# Patient Record
Sex: FEMALE | NOT HISPANIC OR LATINO | URBAN - METROPOLITAN AREA
[De-identification: names, ages, dates, MRNs, and addresses within clinical notes are randomized per-mention and may not be internally consistent; named-entity substitution may affect disease eponyms.]

---

## 2025-01-28 ENCOUNTER — APPOINTMENT (OUTPATIENT)
Dept: OBSTETRICS AND GYNECOLOGY | Facility: CLINIC | Age: 50
End: 2025-01-28
Payer: COMMERCIAL

## 2025-01-28 VITALS
BODY MASS INDEX: 31.72 KG/M2 | HEIGHT: 61 IN | SYSTOLIC BLOOD PRESSURE: 140 MMHG | WEIGHT: 168 LBS | DIASTOLIC BLOOD PRESSURE: 98 MMHG

## 2025-01-28 DIAGNOSIS — Z12.11 SCREENING FOR COLON CANCER: Primary | ICD-10-CM

## 2025-01-28 DIAGNOSIS — L70.9 ACNE, UNSPECIFIED ACNE TYPE: ICD-10-CM

## 2025-01-28 DIAGNOSIS — I10 HYPERTENSION, UNSPECIFIED TYPE: ICD-10-CM

## 2025-01-28 DIAGNOSIS — N93.9 ABNORMAL UTERINE BLEEDING (AUB): ICD-10-CM

## 2025-01-28 PROCEDURE — 3008F BODY MASS INDEX DOCD: CPT | Performed by: OBSTETRICS & GYNECOLOGY

## 2025-01-28 PROCEDURE — 99203 OFFICE O/P NEW LOW 30 MIN: CPT | Performed by: OBSTETRICS & GYNECOLOGY

## 2025-01-28 PROCEDURE — 1036F TOBACCO NON-USER: CPT | Performed by: OBSTETRICS & GYNECOLOGY

## 2025-01-28 PROCEDURE — 3080F DIAST BP >= 90 MM HG: CPT | Performed by: OBSTETRICS & GYNECOLOGY

## 2025-01-28 PROCEDURE — 3077F SYST BP >= 140 MM HG: CPT | Performed by: OBSTETRICS & GYNECOLOGY

## 2025-01-28 RX ORDER — ETONOGESTREL AND ETHINYL ESTRADIOL .12; .015 MG/D; MG/D
RING VAGINAL
COMMUNITY

## 2025-01-28 ASSESSMENT — ENCOUNTER SYMPTOMS
CONSTITUTIONAL NEGATIVE: 0
RESPIRATORY NEGATIVE: 0
CARDIOVASCULAR NEGATIVE: 0
ENDOCRINE NEGATIVE: 0
GASTROINTESTINAL NEGATIVE: 0
ALLERGIC/IMMUNOLOGIC NEGATIVE: 0
HEMATOLOGIC/LYMPHATIC NEGATIVE: 0
PSYCHIATRIC NEGATIVE: 0
MUSCULOSKELETAL NEGATIVE: 0
EYES NEGATIVE: 0
NEUROLOGICAL NEGATIVE: 0

## 2025-01-28 ASSESSMENT — PAIN SCALES - GENERAL: PAINLEVEL_OUTOF10: 0-NO PAIN

## 2025-01-28 NOTE — PROGRESS NOTES
Subjective   Patient ID: Mima Shen is a 49 y.o. female who presents for Annual Exam.    49 year old female presenting for annual exam, and to establish care. Has been on the NuvaRing since 2023, now experiencing some breakthrough bleeding every 6-7 weeks with moderate flow for 1-2 days. Notes hot flashes, night sweats but they have improved.     Last pap 2022-neg/neg  2023- hysteroscopy for AUB, myomectomy   Last mammogram 2024- [facility], benign, getting yearly screening thru job (9/17/2024)- will bring in records   Colonoscopy- needs      Review of Systems   All other systems reviewed and are negative.      Objective   Physical Exam  Vitals and nursing note reviewed.   Constitutional:       Appearance: Normal appearance.   HENT:      Head: Normocephalic.      Nose: Nose normal.      Mouth/Throat:      Mouth: Mucous membranes are moist.      Pharynx: Oropharynx is clear.   Eyes:      Conjunctiva/sclera: Conjunctivae normal.      Pupils: Pupils are equal, round, and reactive to light.   Cardiovascular:      Rate and Rhythm: Normal rate and regular rhythm.      Pulses: Normal pulses.   Pulmonary:      Effort: Pulmonary effort is normal.      Breath sounds: Normal breath sounds.   Abdominal:      General: Bowel sounds are normal.      Palpations: Abdomen is soft.   Genitourinary:     General: Normal vulva.      Exam position: Lithotomy position.      Vagina: Normal.      Cervix: Normal.      Uterus: Normal.       Adnexa: Right adnexa normal and left adnexa normal.      Rectum: Normal.   Musculoskeletal:         General: Normal range of motion.      Cervical back: Normal range of motion and neck supple.   Skin:     General: Skin is warm and dry.      Capillary Refill: Capillary refill takes less than 2 seconds.   Neurological:      General: No focal deficit present.      Mental Status: She is alert and oriented to person, place, and time. Mental status is at baseline.   Psychiatric:         Mood and  Affect: Mood normal.         Assessment/Plan   Problem List Items Addressed This Visit    None  Visit Diagnoses         Codes    Screening for colon cancer    -  Primary Z12.11    Relevant Orders    Colonoscopy Screening; Average Risk Patient    Abnormal uterine bleeding (AUB)     N93.9    Relevant Orders    US PELVIS TRANSABDOMINAL WITH TRANSVAGINAL          Discussed stopping NuvaRing to monitor bleeding and due to hypertension in the office, will need to follow up the primary care. Will obtain TVUS.          ROMAN Nevarez-CNP 01/28/25 4:13 PM

## 2025-02-07 ENCOUNTER — OFFICE VISIT (OUTPATIENT)
Dept: DERMATOLOGY | Facility: CLINIC | Age: 50
End: 2025-02-07
Payer: COMMERCIAL

## 2025-02-07 DIAGNOSIS — R21 RASH AND OTHER NONSPECIFIC SKIN ERUPTION: Primary | ICD-10-CM

## 2025-02-07 DIAGNOSIS — L85.3 XEROSIS CUTIS: ICD-10-CM

## 2025-02-07 DIAGNOSIS — L29.9 PRURITUS: ICD-10-CM

## 2025-02-07 DIAGNOSIS — D48.5 NEOPLASM OF UNCERTAIN BEHAVIOR OF SKIN: ICD-10-CM

## 2025-02-07 PROCEDURE — 99204 OFFICE O/P NEW MOD 45 MIN: CPT | Performed by: DERMATOLOGY

## 2025-02-07 PROCEDURE — 11104 PUNCH BX SKIN SINGLE LESION: CPT | Performed by: DERMATOLOGY

## 2025-02-07 PROCEDURE — 11105 PUNCH BX SKIN EA SEP/ADDL: CPT | Performed by: DERMATOLOGY

## 2025-02-07 ASSESSMENT — ITCH NUMERIC RATING SCALE: HOW SEVERE IS YOUR ITCHING?: 3

## 2025-02-07 NOTE — PROGRESS NOTES
Subjective     Mima Shen is a 49 y.o. female who presents for the following: Rash.  She reports starting years ago, she began developing itchy bumps all over her body.  She thinks it has been getting worse recently with about 1-3 new lesions once a month.  She notes the lesions are itchy and sometimes tender, and no one else in her household has similar lesions.  She reports she had bed bugs many years ago in a different house, so she monitors closely for bed bugs and has not seen any and does not think these are bug bites.  She denies any blisters.        Review of Systems:  No other skin or systemic complaints other than what is documented elsewhere in the note.    The following portions of the chart were reviewed this encounter and updated as appropriate:       Skin Cancer History  No skin cancer on file.    Specialty Problems    None      Past Dermatologic / Past Relevant Medical History:    No history of eczema or psoriasis    Family History:    No family history of eczema or psoriasis    Social History:    The patient states she works in the [employer]    Allergies:  Sulfamethoxazole-trimethoprim    Current Medications / CAM's:    Current Outpatient Medications:     clindamycin (Cleocin T) 1 % lotion, Apply topically 2 times a day., Disp: 60 mL, Rfl: 11    EluRyng 0.12-0.015 mg/24 hr vaginal ring, INSERT 1 RING VAGINALLY AND LEAVE IN PLACE FOR 3 WEEKS THEN REMOVE AND REPLACE IMMEDIATELY TO AVOID MENSES, Disp: , Rfl:      Objective   Well appearing patient in no apparent distress; mood and affect are within normal limits.    A full examination was performed including scalp, face, eyes, ears, nose, lips, neck, chest, axillae, abdomen, back, bilateral upper extremities, and bilateral lower extremities. All findings within normal limits unless otherwise noted below.    Assessment/Plan   1. Rash and other nonspecific skin eruption  Scattered on her trunk and all 4  extremities, there are several pink to erythematous papules, most of which have a central ulceration and/or crust and possible excoriation, and numerous hyperpigmented macules and papules possibly consistent with postinflammatory hyperpigmentation.  There are no yesica pustules or vesicles or bullae on exam today.    Pink to erythematous papules and hyperpigmented macules and papules - trunk and extremities.  The clinical differential diagnosis for these lesions includes possibly prurigo nodularis versus folliculitis versus arthropod bites versus lymphomatoid papulosis.  The nature of each of these conditions and management options, including possible biopsy for further diagnostic evaluation, were discussed extensively with the patient today.  After discussing the risks and benefits of various management options, the patient expressed understanding and wishes to undergo biopsy today.  Thus, at this time, we recommend punch biopsy of 2 representative lesion on the patient's right upper back and right lateral proximal leg in the office today for further diagnostic evaluation.  In the meantime, while we await biopsy results, I recommend empiric topical antibiotic therapy with Clindamycin 1% lotion, which the patient was instructed to apply twice daily to the affected areas of her body for the next 2 weeks.  The risks, benefits, and side effects of this medication were discussed.  The patient expressed understanding, is in agreement with this plan, and wishes to proceed with the biopsy today.    Skin biopsy  Type of biopsy: punch    Informed consent: discussed and consent obtained    Timeout: patient name, date of birth, surgical site, and procedure verified    Procedure prep:  Patient was prepped and draped  Anesthesia: the lesion was anesthetized in a standard fashion    Anesthetic:  1% lidocaine w/ epinephrine 1-100,000 local infiltration  Punch size:  4 mm  Suture size:  4-0  Suture type: Prolene (polypropylene)     Suture removal (days):  14  Hemostasis achieved with: suture    Outcome: patient tolerated procedure well    Post-procedure details: sterile dressing applied and wound care instructions given    Dressing type: bandage and petrolatum      clindamycin (Cleocin T) 1 % lotion  Apply topically 2 times a day.    2. Neoplasm of uncertain behavior of skin (2)  Right Upper Back  Ulcerated hyperpigmented papule              Lesion biopsy  Type of biopsy: punch    Informed consent: discussed and consent obtained    Timeout: patient name, date of birth, surgical site, and procedure verified    Procedure prep:  Patient was prepped and draped  Anesthesia: the lesion was anesthetized in a standard fashion    Anesthetic:  1% lidocaine w/ epinephrine 1-100,000 local infiltration  Punch size:  4 mm  Suture size:  4-0  Suture type: Prolene (polypropylene)    Suture removal (days):  14  Hemostasis achieved with: suture    Outcome: patient tolerated procedure well    Post-procedure details: sterile dressing applied and wound care instructions given    Dressing type: bandage and petrolatum      Staff Communication: Dermatology Local Anesthesia: 1 % Lidocaine / Epinephrine - Amount:    Specimen 1 - Dermatopathology- DERM LAB  Differential Diagnosis: PN vs folliculitis vs LyP vs arthropod  Check Margins Yes/No?:    Comments:    Dermpath Lab: Routine Histopathology (formalin-fixed tissue)    Right Lateral Proximal Leg  Scaled hyperpigmented papule              Lesion biopsy  Type of biopsy: punch    Informed consent: discussed and consent obtained    Timeout: patient name, date of birth, surgical site, and procedure verified    Anesthesia: the lesion was anesthetized in a standard fashion    Anesthetic:  1% lidocaine w/ epinephrine 1-100,000 local infiltration  Punch size:  3 mm  Suture size:  4-0  Suture type: Prolene (polypropylene)    Suture removal (days):  14  Hemostasis achieved with: suture    Outcome: patient tolerated procedure  well    Post-procedure details: sterile dressing applied and wound care instructions given    Dressing type: bandage and petrolatum      Staff Communication: Dermatology Local Anesthesia: 1 % Lidocaine / Epinephrine - Amount:    Specimen 2 - Dermatopathology- DERM LAB  Differential Diagnosis: PN vs folliculitis vs LyP vs arthropod  Check Margins Yes/No?:    Comments:    Dermpath Lab: Routine Histopathology (formalin-fixed tissue)    3. Pruritus    4. Xerosis cutis  Diffuse generalized dry, scaly skin.    Xerosis.  We emphasized the importance of dry, sensitive skin care, including the use of a mild soap, such as Dove, and frequent and aggressive moisturization, at least twice daily and immediately following showers or baths, with recommended over-the-counter moisturizing creams, such as Eucerin, Cetaphil, Cerave, or Aveeno, or Vaseline or Aquaphor ointments.        Jareth Saul MD  PGY-2, Dermatology      I saw and evaluated the patient. I personally obtained the key and critical portions of the history and physical exam or was physically present for key and critical portions performed by the resident/fellow. I reviewed the resident/fellow's documentation and discussed the patient with the resident/fellow. I agree with the resident/fellow's medical decision making as documented in the note.    Nikos Shannon MD

## 2025-02-08 RX ORDER — CLINDAMYCIN PHOSPHATE 10 UG/ML
LOTION TOPICAL 2 TIMES DAILY
Qty: 60 ML | Refills: 11 | Status: SHIPPED | OUTPATIENT
Start: 2025-02-08 | End: 2026-02-08

## 2025-02-10 ENCOUNTER — APPOINTMENT (OUTPATIENT)
Dept: RADIOLOGY | Facility: HOSPITAL | Age: 50
End: 2025-02-10
Payer: COMMERCIAL

## 2025-02-10 ENCOUNTER — TELEPHONE (OUTPATIENT)
Dept: DERMATOLOGY | Facility: CLINIC | Age: 50
End: 2025-02-10
Payer: COMMERCIAL

## 2025-02-10 NOTE — TELEPHONE ENCOUNTER
Pt left VM that when she saw Dr Seals on Friday she was told that he was going to send over a ATB oint to her pharmacy ?and when she went to her pharmacy it was not there ...

## 2025-02-11 ENCOUNTER — HOSPITAL ENCOUNTER (OUTPATIENT)
Dept: RADIOLOGY | Facility: HOSPITAL | Age: 50
Discharge: HOME | End: 2025-02-11
Payer: COMMERCIAL

## 2025-02-11 DIAGNOSIS — N93.9 ABNORMAL UTERINE BLEEDING (AUB): ICD-10-CM

## 2025-02-11 LAB
LABORATORY COMMENT REPORT: NORMAL
PATH REPORT.FINAL DX SPEC: NORMAL
PATH REPORT.GROSS SPEC: NORMAL
PATH REPORT.MICROSCOPIC SPEC OTHER STN: NORMAL
PATH REPORT.RELEVANT HX SPEC: NORMAL
PATH REPORT.TOTAL CANCER: NORMAL

## 2025-02-11 PROCEDURE — 76856 US EXAM PELVIC COMPLETE: CPT | Performed by: STUDENT IN AN ORGANIZED HEALTH CARE EDUCATION/TRAINING PROGRAM

## 2025-02-11 PROCEDURE — 76830 TRANSVAGINAL US NON-OB: CPT | Performed by: STUDENT IN AN ORGANIZED HEALTH CARE EDUCATION/TRAINING PROGRAM

## 2025-02-11 PROCEDURE — 76830 TRANSVAGINAL US NON-OB: CPT

## 2025-02-14 ENCOUNTER — APPOINTMENT (OUTPATIENT)
Dept: GASTROENTEROLOGY | Facility: EXTERNAL LOCATION | Age: 50
End: 2025-02-14
Payer: COMMERCIAL

## 2025-02-20 ENCOUNTER — APPOINTMENT (OUTPATIENT)
Dept: DERMATOLOGY | Facility: CLINIC | Age: 50
End: 2025-02-20
Payer: COMMERCIAL

## 2025-02-20 DIAGNOSIS — L57.8 DIFFUSE PHOTODAMAGE OF SKIN: ICD-10-CM

## 2025-02-20 DIAGNOSIS — R21 RASH AND OTHER NONSPECIFIC SKIN ERUPTION: Primary | ICD-10-CM

## 2025-02-20 DIAGNOSIS — L85.3 XEROSIS CUTIS: ICD-10-CM

## 2025-02-20 DIAGNOSIS — L73.9 FOLLICULITIS: ICD-10-CM

## 2025-02-20 PROCEDURE — 99214 OFFICE O/P EST MOD 30 MIN: CPT | Performed by: DERMATOLOGY

## 2025-02-20 RX ORDER — MINOCYCLINE HYDROCHLORIDE 100 MG/1
100 CAPSULE ORAL 2 TIMES DAILY
Qty: 28 CAPSULE | Refills: 0 | Status: SHIPPED | OUTPATIENT
Start: 2025-02-20 | End: 2025-03-06

## 2025-02-20 RX ORDER — TRIAMCINOLONE ACETONIDE 0.25 MG/G
CREAM TOPICAL
Qty: 80 G | Refills: 1 | Status: SHIPPED | OUTPATIENT
Start: 2025-02-20

## 2025-02-21 NOTE — PROGRESS NOTES
"Rekha Shen is a 49 y.o. female who presents for the following: Follow-up.  She reports beginning over 2 years ago she has been developing itchy bumps all over her body.  She thinks it has been getting worse recently with about 1-3 new lesions once a month.  She notes the lesions are itchy and sometimes tender, and no one else in her household has similar lesions.  She reports she had bed bugs many years ago in a different house, so she monitors closely for bed bugs and has not seen any and does not think these are bug bites.  She also states she has been treated with oral antibiotics for similar lesions in the past, which has helped.    Punch biopsy of 2 representative lesions performed at her last visit in our office on 2/7/25 revealed \"cutaneous ulceration\" on her right upper back, in which the features suggest outside trauma such as excoriation, and given the roughly folliculocentric nature of the ulceration, an excoriated folliculitis could not be excluded, and an excoriated arthropod assault could not be excluded either, and \"epidermal erosion\" on her right lateral proximal leg, the findings of which may be seen in an area of outside trauma such as excoriation.    Today, the patient states the biopsy sites healed well.  Since her last visit, she reports she has been using Clindamycin 1% lotion, which has not seemed to help.  She notes some of the bumps are still very itchy, and she again states she has been treated with oral antibiotics in the past, which have helped.  She denies any blisters.      Review of Systems:  No other skin or systemic complaints other than what is documented elsewhere in the note.    The following portions of the chart were reviewed this encounter and updated as appropriate:       Skin Cancer History  No skin cancer on file.    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 [employer]    Allergies:  Sulfamethoxazole-trimethoprim    Current Medications / CAM's:    Current Outpatient Medications:     clindamycin (Cleocin T) 1 % lotion, Apply topically 2 times a day., Disp: 60 mL, Rfl: 11    EluRyng 0.12-0.015 mg/24 hr vaginal ring, INSERT 1 RING VAGINALLY AND LEAVE IN PLACE FOR 3 WEEKS THEN REMOVE AND REPLACE IMMEDIATELY TO AVOID MENSES, Disp: , Rfl:     minocycline 100 mg capsule, Take 1 capsule (100 mg) by mouth 2 times a day for 14 days., Disp: 28 capsule, Rfl: 0    triamcinolone (Kenalog) 0.025 % cream, Apply twice daily to affected areas of body (avoid face, groin, body folds) for 2 weeks, then taper to twice daily on weekends only; repeat every 6-8 weeks as needed for flares, Disp: 80 g, Rfl: 1     Objective   Well appearing patient in no apparent distress; mood and affect are within normal limits.    A full examination was performed including scalp, face, eyes, ears, nose, lips, neck, chest, axillae, abdomen, back, bilateral upper extremities, and bilateral lower extremities. All findings within normal limits unless otherwise noted below.    Assessment/Plan   1. Rash and other nonspecific skin eruption  Right Breast  Scattered on her trunk and all 4 extremities, there are several pink to erythematous papules, most of which have a central ulceration and/or crust and possible excoriation, and numerous hyperpigmented macules and papules possibly consistent with postinflammatory hyperpigmentation.  There are no yesica pustules or vesicles or bullae on exam today.    Pink to erythematous papules and hyperpigmented macules and papules - trunk and extremities.  Based on the patient's history, recent exam and repeat exam today, and the histologic findings in her recent biopsies, the favored clinico-pathologic differential diagnosis for these lesions  includes possibly excoriations, such as neurotic excoriations, versus excoriated folliculitis versus excoriated arthropod assault.  The nature of each of these conditions and management options were discussed extensively with the patient in the office today.  After discussing the risks and benefits of various management options, the patient expressed understanding and wishes to begin a course of oral antibiotic therapy for folliculitis as detailed below.    In addition, given the possibility of excoriated arthropod bites, I also recommend topical steroid therapy with Triamcinolone 0.025% cream, which she was instructed to apply twice daily to the affected lesions on her body (avoid face, groin, body folds) for the next 1 to 2 weeks.  The risks, benefits, and side effects of this medication, including possible skin atrophy with its overuse, were discussed.  Lastly, given the inability to exclude an arthropod assault, I recommended she hire an  to come out to her home to investigate for possible arthropod infestations, such as bedbugs, spiders, ants, or other arthropods.  The patient expressed understanding and is in agreement with this plan.  Of note, her sutures were removed in the office today as well.    triamcinolone (Kenalog) 0.025 % cream - Right Breast  Apply twice daily to affected areas of body (avoid face, groin, body folds) for 2 weeks, then taper to twice daily on weekends only; repeat every 6-8 weeks as needed for flares    Related Medications  clindamycin (Cleocin T) 1 % lotion  Apply topically 2 times a day.    2. Folliculitis  Left Breast  Scattered on her trunk and all 4 extremities, there are several pink to erythematous papules, most of which have a central ulceration and/or crust and possible excoriation, and numerous hyperpigmented macules and papules possibly consistent with postinflammatory hyperpigmentation.  There are no yesica pustules or vesicles or bullae on exam  today.    Folliculitis - trunk and extremities.  The bacterial nature of this condition and treatment options, including topical therapy and oral antibiotics, were discussed extensively with the patient today.  After discussing the risks and benefits of various treatment options, the patient expressed understanding and wishes to begin with combination oral antibiotic and topical therapy.  Thus, I prescribed a 2-week course of Minocycline 100 mg PO BID as well as topical antibiotic therapy with Clindamycin 1% lotion, which she was instructed to apply twice daily to the affected areas.  The risks, benefits, and side effects of these medications, including possible dizziness or headaches with Minocycline, were discussed; the patient was instructed to take the oral antibiotic with food and a glass of water.  Lastly, I emphasized the importance of avoiding any further picking, scratching, excoriation, or other manipulation of the lesions, as these behaviors can lead to permanent scarring and damage to her skin.  She expressed understanding and is in agreement with this plan.    minocycline 100 mg capsule - Left Breast  Take 1 capsule (100 mg) by mouth 2 times a day for 14 days.    3. Xerosis cutis  Diffuse generalized dry, scaly skin.    Xerosis.  I emphasized the importance of dry, sensitive skin care, including the use of a mild soap, such as Dove, and frequent and aggressive moisturization, at least twice daily and immediately following showers or baths, with recommended over-the-counter moisturizing creams, such as Eucerin, Cetaphil, Cerave, or Aveeno, or Vaseline or Aquaphor ointments.    4. Diffuse photodamage of skin  Photodistributed  Diffuse photodamage with actinic changes with telangiectasia and mottled pigmentation in sun-exposed areas.    Photodamage.  The signs and symptoms of skin cancer were reviewed and the patient was advised to practice sun protection and sun avoidance, use daily sunscreen, and perform  regular self skin exams.  Sun protection was discussed, including avoiding the mid-day sun, wearing a sunscreen with SPF at least 50, and stressing the need for reapplication of sunscreen and applying more than they think they need.

## 2025-03-19 ENCOUNTER — APPOINTMENT (OUTPATIENT)
Dept: PRIMARY CARE | Facility: CLINIC | Age: 50
End: 2025-03-19
Payer: COMMERCIAL

## 2025-04-04 ENCOUNTER — APPOINTMENT (OUTPATIENT)
Dept: GASTROENTEROLOGY | Facility: EXTERNAL LOCATION | Age: 50
End: 2025-04-04
Payer: COMMERCIAL

## 2025-04-04 DIAGNOSIS — Z12.11 SCREENING FOR COLON CANCER: ICD-10-CM

## 2025-04-04 DIAGNOSIS — D12.8 BENIGN NEOPLASM OF RECTUM AND ANAL CANAL: ICD-10-CM

## 2025-04-04 DIAGNOSIS — D12.9 BENIGN NEOPLASM OF RECTUM AND ANAL CANAL: ICD-10-CM

## 2025-04-04 DIAGNOSIS — Z12.11 SCREENING FOR COLON CANCER: Primary | ICD-10-CM

## 2025-04-04 PROCEDURE — 88305 TISSUE EXAM BY PATHOLOGIST: CPT | Performed by: PATHOLOGY

## 2025-04-04 PROCEDURE — 45380 COLONOSCOPY AND BIOPSY: CPT | Performed by: INTERNAL MEDICINE

## 2025-04-04 PROCEDURE — 0753T DGTZ GLS MCRSCP SLD LEVEL IV: CPT

## 2025-04-04 PROCEDURE — 88305 TISSUE EXAM BY PATHOLOGIST: CPT

## 2025-04-07 ENCOUNTER — LAB REQUISITION (OUTPATIENT)
Dept: LAB | Facility: HOSPITAL | Age: 50
End: 2025-04-07
Payer: COMMERCIAL

## 2025-04-14 ENCOUNTER — APPOINTMENT (OUTPATIENT)
Dept: PRIMARY CARE | Facility: CLINIC | Age: 50
End: 2025-04-14
Payer: COMMERCIAL

## 2025-04-14 VITALS
SYSTOLIC BLOOD PRESSURE: 138 MMHG | OXYGEN SATURATION: 98 % | HEART RATE: 84 BPM | BODY MASS INDEX: 32.1 KG/M2 | WEIGHT: 170 LBS | DIASTOLIC BLOOD PRESSURE: 88 MMHG | HEIGHT: 61 IN

## 2025-04-14 DIAGNOSIS — R03.0 ELEVATED BLOOD PRESSURE READING: ICD-10-CM

## 2025-04-14 DIAGNOSIS — Z00.00 ROUTINE GENERAL MEDICAL EXAMINATION AT A HEALTH CARE FACILITY: Primary | ICD-10-CM

## 2025-04-14 DIAGNOSIS — R01.1 HEART MURMUR: ICD-10-CM

## 2025-04-14 DIAGNOSIS — Z76.89 ESTABLISHING CARE WITH NEW DOCTOR, ENCOUNTER FOR: ICD-10-CM

## 2025-04-14 PROCEDURE — 3008F BODY MASS INDEX DOCD: CPT | Performed by: PHYSICIAN ASSISTANT

## 2025-04-14 PROCEDURE — 1036F TOBACCO NON-USER: CPT | Performed by: PHYSICIAN ASSISTANT

## 2025-04-14 PROCEDURE — 90472 IMMUNIZATION ADMIN EACH ADD: CPT | Performed by: PHYSICIAN ASSISTANT

## 2025-04-14 PROCEDURE — 90715 TDAP VACCINE 7 YRS/> IM: CPT | Performed by: PHYSICIAN ASSISTANT

## 2025-04-14 PROCEDURE — 90471 IMMUNIZATION ADMIN: CPT | Performed by: PHYSICIAN ASSISTANT

## 2025-04-14 PROCEDURE — 90750 HZV VACC RECOMBINANT IM: CPT | Performed by: PHYSICIAN ASSISTANT

## 2025-04-14 PROCEDURE — 99396 PREV VISIT EST AGE 40-64: CPT | Performed by: PHYSICIAN ASSISTANT

## 2025-04-14 RX ORDER — FLUTICASONE PROPIONATE 50 MCG
1 SPRAY, SUSPENSION (ML) NASAL
COMMUNITY
Start: 2025-02-27

## 2025-04-14 ASSESSMENT — ENCOUNTER SYMPTOMS
PALPITATIONS: 0
HEMATOLOGIC/LYMPHATIC NEGATIVE: 1
LOSS OF SENSATION IN FEET: 0
CONSTITUTIONAL NEGATIVE: 1
VOMITING: 0
ENDOCRINE NEGATIVE: 1
PSYCHIATRIC NEGATIVE: 1
SHORTNESS OF BREATH: 0
DEPRESSION: 0
MUSCULOSKELETAL NEGATIVE: 1
ABDOMINAL PAIN: 0
OCCASIONAL FEELINGS OF UNSTEADINESS: 0
BACK PAIN: 0
EYES NEGATIVE: 1
WHEEZING: 0
RESPIRATORY NEGATIVE: 1
NEUROLOGICAL NEGATIVE: 1
ARTHRALGIAS: 0
NAUSEA: 0
ALLERGIC/IMMUNOLOGIC NEGATIVE: 1
NERVOUS/ANXIOUS: 0
COUGH: 0
DIZZINESS: 0
HEADACHES: 0

## 2025-04-14 ASSESSMENT — PATIENT HEALTH QUESTIONNAIRE - PHQ9
2. FEELING DOWN, DEPRESSED OR HOPELESS: NOT AT ALL
1. LITTLE INTEREST OR PLEASURE IN DOING THINGS: NOT AT ALL
SUM OF ALL RESPONSES TO PHQ9 QUESTIONS 1 AND 2: 0

## 2025-04-14 NOTE — PROGRESS NOTES
"Subjective   Patient ID: Mima Shen is a 49 y.o. female who presents for New Patient Visit and Annual Exam.    HPI   Patient Mima Shen 49 y.o. female is here today [facility]   previous PCP -none     single, works at [employer] -- lives with kids - 32 -33- 17 yo   specialists -- gyn   UTD dental and vision - UTD     PMhx significant medical hx none - elevated BP at times 140s/  PShx: fibroids   Social hx: etoh-social few per week  , no tobacco use, no illicit drug use   Gluten free diet and exercise- steps walks   immunizations shingrex and tdap due   FMhx: mother HTN , father HTN DM    Past medical, surgical, social, and family history all reviewed     Hx --heart murmur-and elevated blood pressures 140s over 90s--has never seen cardiology    No issues no chest pain no shortness of breath no no edema no headaches  patient states feeling well otherwise  denies fever, chills, N/V, headache, dizziness, CP, SOB, palpitations, edema, numbness, tingling, weakness  A chaperone was offered to the patient for the physical exam /  exam and was declined     Review of Systems   Constitutional: Negative.    HENT: Negative.     Eyes: Negative.    Respiratory: Negative.  Negative for cough, shortness of breath and wheezing.    Cardiovascular:  Negative for chest pain and palpitations.   Gastrointestinal:  Negative for abdominal pain, nausea and vomiting.   Endocrine: Negative.    Genitourinary: Negative.    Musculoskeletal: Negative.  Negative for arthralgias and back pain.   Skin: Negative.  Negative for rash.   Allergic/Immunologic: Negative.    Neurological: Negative.  Negative for dizziness and headaches.   Hematological: Negative.    Psychiatric/Behavioral: Negative.  The patient is not nervous/anxious.        Objective   /88 (BP Location: Right arm, Patient Position: Sitting)   Pulse 84   Ht 1.549 m (5' 1\")   Wt 77.1 kg (170 lb)   SpO2 98%   BMI 32.12 kg/m²     Physical " Exam  Vitals and nursing note reviewed.   Constitutional:       Appearance: Normal appearance. She is normal weight.   HENT:      Head: Normocephalic and atraumatic.      Right Ear: Tympanic membrane, ear canal and external ear normal.      Left Ear: Tympanic membrane, ear canal and external ear normal.      Nose: Nose normal.      Mouth/Throat:      Mouth: Mucous membranes are moist.      Pharynx: Oropharynx is clear.   Eyes:      Extraocular Movements: Extraocular movements intact.      Pupils: Pupils are equal, round, and reactive to light.   Cardiovascular:      Rate and Rhythm: Normal rate and regular rhythm.      Heart sounds: Normal heart sounds.   Pulmonary:      Effort: Pulmonary effort is normal.      Breath sounds: Normal breath sounds.   Abdominal:      General: Abdomen is flat. Bowel sounds are normal.      Palpations: Abdomen is soft.   Genitourinary:     Comments: Per GYN   Musculoskeletal:         General: Normal range of motion.      Cervical back: Normal range of motion and neck supple.   Skin:     General: Skin is warm and dry.   Neurological:      General: No focal deficit present.      Mental Status: She is alert and oriented to person, place, and time.   Psychiatric:         Mood and Affect: Mood normal.         Behavior: Behavior normal.         Thought Content: Thought content normal.         Judgment: Judgment normal.         Assessment/Plan   Problem List Items Addressed This Visit    None  Visit Diagnoses       Routine general medical examination at a health care facility    -  Primary    Establishing care with new doctor, encounter for        Elevated blood pressure reading        Relevant Orders    Referral to Cardiology    Heart murmur        Relevant Orders    Referral to Cardiology          Well exam /est    -  patient stable and healthy  -  discussed healthy diet and exercise plan   -  f/u with specialists as directed   -  UTD on dental and vision exams, f/u as directed   -  Labs  with MM -- employer   -Vaccinations recommended today: tdap and shingrex due   -Follow-up annual exam in one year  -Colon cancer screening UTD - 4/2025  -Follow-up in one week to discuss all results      Heart murmur and elevated Blood pressure -- refer to cardiology     Labs and mammogram UTD per pt --done with [facility] 2/2025       Results of my examination, clinical findings, impression and diagnoses discussed with the patient as well as results of the latest test/lab work. The patient was in agreement with the plan and verbalized a good understanding of instructions and plans for treatment. At the end of the visit today, the patient felt that all questions had been answered satisfactorily. The patient was pleased with the visit and very appreciative for the care rendered.

## 2025-04-15 LAB
LABORATORY COMMENT REPORT: NORMAL
PATH REPORT.FINAL DX SPEC: NORMAL
PATH REPORT.GROSS SPEC: NORMAL
PATH REPORT.RELEVANT HX SPEC: NORMAL
PATH REPORT.TOTAL CANCER: NORMAL

## 2025-05-14 ENCOUNTER — OFFICE VISIT (OUTPATIENT)
Dept: CARDIOLOGY | Facility: HOSPITAL | Age: 50
End: 2025-05-14
Payer: COMMERCIAL

## 2025-05-14 VITALS
BODY MASS INDEX: 32.1 KG/M2 | HEIGHT: 61 IN | SYSTOLIC BLOOD PRESSURE: 165 MMHG | DIASTOLIC BLOOD PRESSURE: 103 MMHG | WEIGHT: 170 LBS | OXYGEN SATURATION: 98 % | HEART RATE: 87 BPM

## 2025-05-14 DIAGNOSIS — R01.1 HEART MURMUR: ICD-10-CM

## 2025-05-14 DIAGNOSIS — I10 HYPERTENSION, UNSPECIFIED TYPE: Primary | ICD-10-CM

## 2025-05-14 DIAGNOSIS — E78.5 HYPERLIPIDEMIA, UNSPECIFIED HYPERLIPIDEMIA TYPE: ICD-10-CM

## 2025-05-14 PROCEDURE — 3080F DIAST BP >= 90 MM HG: CPT | Performed by: INTERNAL MEDICINE

## 2025-05-14 PROCEDURE — 99205 OFFICE O/P NEW HI 60 MIN: CPT | Performed by: INTERNAL MEDICINE

## 2025-05-14 PROCEDURE — 99202 OFFICE O/P NEW SF 15 MIN: CPT | Performed by: INTERNAL MEDICINE

## 2025-05-14 PROCEDURE — 93005 ELECTROCARDIOGRAM TRACING: CPT | Performed by: INTERNAL MEDICINE

## 2025-05-14 PROCEDURE — 3008F BODY MASS INDEX DOCD: CPT | Performed by: INTERNAL MEDICINE

## 2025-05-14 PROCEDURE — 3077F SYST BP >= 140 MM HG: CPT | Performed by: INTERNAL MEDICINE

## 2025-05-14 RX ORDER — AMLODIPINE BESYLATE 5 MG/1
5 TABLET ORAL DAILY
Qty: 90 TABLET | Refills: 1 | Status: SHIPPED | OUTPATIENT
Start: 2025-05-14

## 2025-05-14 NOTE — PROGRESS NOTES
"Chief Complaint:   No chief complaint on file.     History Of Present Illness:    Mima Shen is a 49 y.o. female here regarding a heart murmur and elevated BP's. She has a history of fibroids s/p hysteroscopic myomectomy, associated Fe deficient anemia, hyperlipidemia, and class I obesity.    She reports feeling well and denies cardiovascular symptoms.         Last Recorded Vitals:  Vitals:    05/14/25 1540   BP: (!) 165/103   BP Location: Left arm   Patient Position: Sitting   Pulse: 87   SpO2: 98%   Weight: 77.1 kg (170 lb)   Height: 1.549 m (5' 1\")              Allergies:  Sulfamethoxazole-trimethoprim    Outpatient Medications:  Current Outpatient Medications   Medication Instructions    clindamycin (Cleocin T) 1 % lotion Topical, 2 times daily    EluRyng 0.12-0.015 mg/24 hr vaginal ring INSERT 1 RING VAGINALLY AND LEAVE IN PLACE FOR 3 WEEKS THEN REMOVE AND REPLACE IMMEDIATELY TO AVOID MENSES    fluticasone (Flonase) 50 mcg/actuation nasal spray 1 spray, Daily RT    triamcinolone (Kenalog) 0.025 % cream Apply twice daily to affected areas of body (avoid face, groin, body folds) for 2 weeks, then taper to twice daily on weekends only; repeat every 6-8 weeks as needed for flares         Physical Exam:  Gen Well nourished middle aged female sitting up in NAD. Body mass index is 32.12 kg/m².  Eyes sclera nonicteric. Pupils normal size and symmetric.  Neck supple with no masses or thyromegaly.  CV rrr. 2/6 SM. No JVD or leg edema. No carotid bruits appreciated.  Pulm Lungs clear with normal respiratory effort.  Skin No bruises rashes or jaundice. No induration or nodules.  Psych Appropriate affect and mood. Normal judgement and insight.  Neuro Alert and conversant. Grossly nonfocal.       I reviewed the patient's ECG - NSR    I reviewed most recent imaging / labs / and office notes as well as details of any recent hospitalizations or ED visits.    Assessment/Plan   1.  Hypertension   BP uncontrolled. Starting " daily amlodipine. Continued exercise encouraged. Checking a renin / kenyon + PTH level.    2. Heart murmur  ? Flow murmur related to anemia. Checking a CBC and a TTE.    3. Hyperlipidemia   History of. Last LDL 2/2025 was fair. Will consider CAC testing next visit to help determine whether statin therapy will be beneficial to her long term.        Follow-up 2-3 months         Frank Roblero MD

## 2025-05-15 LAB
ALBUMIN SERPL-MCNC: 4.2 G/DL (ref 3.6–5.1)
ALDOST SERPL-MCNC: NORMAL NG/DL
ALDOST/RENIN PLAS-RTO: NORMAL {RATIO}
ALP SERPL-CCNC: 53 U/L (ref 37–153)
ALT SERPL-CCNC: 15 U/L (ref 6–29)
ANION GAP SERPL CALCULATED.4IONS-SCNC: 11 MMOL/L (CALC) (ref 7–17)
AST SERPL-CCNC: 16 U/L (ref 10–35)
ATRIAL RATE: 87 BPM
BILIRUB SERPL-MCNC: 0.3 MG/DL (ref 0.2–1.2)
BUN SERPL-MCNC: 10 MG/DL (ref 7–25)
CALCIUM SERPL-MCNC: 9.7 MG/DL (ref 8.6–10.4)
CHLORIDE SERPL-SCNC: 106 MMOL/L (ref 98–110)
CO2 SERPL-SCNC: 24 MMOL/L (ref 20–32)
CREAT SERPL-MCNC: 0.72 MG/DL (ref 0.5–1.03)
EGFRCR SERPLBLD CKD-EPI 2021: 101 ML/MIN/1.73M2
ERYTHROCYTE [DISTWIDTH] IN BLOOD BY AUTOMATED COUNT: 14.4 % (ref 11–15)
GLUCOSE SERPL-MCNC: 88 MG/DL (ref 65–99)
HCT VFR BLD AUTO: 44.2 % (ref 35–45)
HGB BLD-MCNC: 14.7 G/DL (ref 11.7–15.5)
MCH RBC QN AUTO: 29 PG (ref 27–33)
MCHC RBC AUTO-ENTMCNC: 33.3 G/DL (ref 32–36)
MCV RBC AUTO: 87.2 FL (ref 80–100)
P AXIS: 59 DEGREES
P OFFSET: 196 MS
P ONSET: 143 MS
PLATELET # BLD AUTO: 316 THOUSAND/UL (ref 140–400)
PMV BLD REES-ECKER: 9.6 FL (ref 7.5–12.5)
POTASSIUM SERPL-SCNC: 4.1 MMOL/L (ref 3.5–5.3)
PR INTERVAL: 152 MS
PROT SERPL-MCNC: 7 G/DL (ref 6.1–8.1)
PTH-INTACT SERPL-MCNC: 31 PG/ML (ref 16–77)
Q ONSET: 219 MS
QRS COUNT: 15 BEATS
QRS DURATION: 86 MS
QT INTERVAL: 376 MS
QTC CALCULATION(BAZETT): 452 MS
QTC FREDERICIA: 425 MS
R AXIS: 2 DEGREES
RBC # BLD AUTO: 5.07 MILLION/UL (ref 3.8–5.1)
RENIN PLAS-CCNC: NORMAL NG/ML/H
SODIUM SERPL-SCNC: 141 MMOL/L (ref 135–146)
T AXIS: 69 DEGREES
T OFFSET: 407 MS
TSH SERPL-ACNC: 0.78 MIU/L
VENTRICULAR RATE: 87 BPM
WBC # BLD AUTO: 7.3 THOUSAND/UL (ref 3.8–10.8)

## 2025-05-19 LAB
ALBUMIN SERPL-MCNC: 4.2 G/DL (ref 3.6–5.1)
ALDOST SERPL-MCNC: 20 NG/DL
ALDOST/RENIN PLAS-RTO: 17.1 RATIO (ref 0.9–28.9)
ALP SERPL-CCNC: 53 U/L (ref 37–153)
ALT SERPL-CCNC: 15 U/L (ref 6–29)
ANION GAP SERPL CALCULATED.4IONS-SCNC: 11 MMOL/L (CALC) (ref 7–17)
AST SERPL-CCNC: 16 U/L (ref 10–35)
BILIRUB SERPL-MCNC: 0.3 MG/DL (ref 0.2–1.2)
BUN SERPL-MCNC: 10 MG/DL (ref 7–25)
CALCIUM SERPL-MCNC: 9.7 MG/DL (ref 8.6–10.4)
CHLORIDE SERPL-SCNC: 106 MMOL/L (ref 98–110)
CO2 SERPL-SCNC: 24 MMOL/L (ref 20–32)
CREAT SERPL-MCNC: 0.72 MG/DL (ref 0.5–1.03)
EGFRCR SERPLBLD CKD-EPI 2021: 101 ML/MIN/1.73M2
ERYTHROCYTE [DISTWIDTH] IN BLOOD BY AUTOMATED COUNT: 14.4 % (ref 11–15)
GLUCOSE SERPL-MCNC: 88 MG/DL (ref 65–99)
HCT VFR BLD AUTO: 44.2 % (ref 35–45)
HGB BLD-MCNC: 14.7 G/DL (ref 11.7–15.5)
MCH RBC QN AUTO: 29 PG (ref 27–33)
MCHC RBC AUTO-ENTMCNC: 33.3 G/DL (ref 32–36)
MCV RBC AUTO: 87.2 FL (ref 80–100)
PLATELET # BLD AUTO: 316 THOUSAND/UL (ref 140–400)
PMV BLD REES-ECKER: 9.6 FL (ref 7.5–12.5)
POTASSIUM SERPL-SCNC: 4.1 MMOL/L (ref 3.5–5.3)
PROT SERPL-MCNC: 7 G/DL (ref 6.1–8.1)
PTH-INTACT SERPL-MCNC: 31 PG/ML (ref 16–77)
RBC # BLD AUTO: 5.07 MILLION/UL (ref 3.8–5.1)
RENIN PLAS-CCNC: 1.17 NG/ML/H (ref 0.25–5.82)
SODIUM SERPL-SCNC: 141 MMOL/L (ref 135–146)
TSH SERPL-ACNC: 0.78 MIU/L
WBC # BLD AUTO: 7.3 THOUSAND/UL (ref 3.8–10.8)

## 2025-06-02 ENCOUNTER — HOSPITAL ENCOUNTER (OUTPATIENT)
Dept: CARDIOLOGY | Facility: HOSPITAL | Age: 50
Discharge: HOME | End: 2025-06-02
Payer: COMMERCIAL

## 2025-06-02 DIAGNOSIS — R01.1 HEART MURMUR: ICD-10-CM

## 2025-06-02 DIAGNOSIS — I10 HYPERTENSION, UNSPECIFIED TYPE: ICD-10-CM

## 2025-06-02 PROCEDURE — 93306 TTE W/DOPPLER COMPLETE: CPT | Performed by: INTERNAL MEDICINE

## 2025-06-02 PROCEDURE — 93306 TTE W/DOPPLER COMPLETE: CPT

## 2025-06-03 LAB
AORTIC VALVE MEAN GRADIENT: 6 MMHG
AORTIC VALVE PEAK VELOCITY: 1.79 M/S
AV PEAK GRADIENT: 13 MMHG
AVA (PEAK VEL): 2.14 CM2
AVA (VTI): 2.23 CM2
EJECTION FRACTION APICAL 4 CHAMBER: 64
EJECTION FRACTION: 69 %
LEFT ATRIUM VOLUME AREA LENGTH INDEX BSA: 26.6 ML/M2
LEFT VENTRICLE INTERNAL DIMENSION DIASTOLE: 3.93 CM (ref 3.5–6)
LEFT VENTRICULAR OUTFLOW TRACT DIAMETER: 1.96 CM
MITRAL VALVE E/A RATIO: 0.86
RIGHT VENTRICLE PEAK SYSTOLIC PRESSURE: 27 MMHG
TRICUSPID ANNULAR PLANE SYSTOLIC EXCURSION: 2.1 CM

## 2025-06-04 LAB
ATRIAL RATE: 87 BPM
P AXIS: 59 DEGREES
P OFFSET: 196 MS
P ONSET: 143 MS
PR INTERVAL: 152 MS
Q ONSET: 219 MS
QRS COUNT: 15 BEATS
QRS DURATION: 86 MS
QT INTERVAL: 376 MS
QTC CALCULATION(BAZETT): 452 MS
QTC FREDERICIA: 425 MS
R AXIS: 2 DEGREES
T AXIS: 69 DEGREES
T OFFSET: 407 MS
VENTRICULAR RATE: 87 BPM

## 2025-06-12 ENCOUNTER — APPOINTMENT (OUTPATIENT)
Dept: OTOLARYNGOLOGY | Facility: CLINIC | Age: 50
End: 2025-06-12
Payer: COMMERCIAL

## 2025-06-12 VITALS — HEIGHT: 61 IN | WEIGHT: 168 LBS | BODY MASS INDEX: 31.72 KG/M2

## 2025-06-12 DIAGNOSIS — J30.89 SEASONAL ALLERGIC RHINITIS DUE TO OTHER ALLERGIC TRIGGER: ICD-10-CM

## 2025-06-12 DIAGNOSIS — R09.81 NASAL CONGESTION: ICD-10-CM

## 2025-06-12 DIAGNOSIS — J34.3 HYPERTROPHY OF NASAL TURBINATES: ICD-10-CM

## 2025-06-12 DIAGNOSIS — J31.0 CHRONIC RHINITIS: Primary | ICD-10-CM

## 2025-06-12 DIAGNOSIS — I10 HYPERTENSION, UNSPECIFIED TYPE: Primary | ICD-10-CM

## 2025-06-12 DIAGNOSIS — J34.2 NASAL SEPTAL DEVIATION: ICD-10-CM

## 2025-06-12 PROCEDURE — 3008F BODY MASS INDEX DOCD: CPT | Performed by: NURSE PRACTITIONER

## 2025-06-12 PROCEDURE — 31231 NASAL ENDOSCOPY DX: CPT | Performed by: NURSE PRACTITIONER

## 2025-06-12 PROCEDURE — 99204 OFFICE O/P NEW MOD 45 MIN: CPT | Performed by: NURSE PRACTITIONER

## 2025-06-12 PROCEDURE — 1036F TOBACCO NON-USER: CPT | Performed by: NURSE PRACTITIONER

## 2025-06-12 RX ORDER — LOSARTAN POTASSIUM AND HYDROCHLOROTHIAZIDE 12.5; 5 MG/1; MG/1
1 TABLET ORAL DAILY
Qty: 30 TABLET | Refills: 1 | Status: SHIPPED | OUTPATIENT
Start: 2025-06-12

## 2025-06-12 ASSESSMENT — PATIENT HEALTH QUESTIONNAIRE - PHQ9
SUM OF ALL RESPONSES TO PHQ9 QUESTIONS 1 AND 2: 0
1. LITTLE INTEREST OR PLEASURE IN DOING THINGS: NOT AT ALL
2. FEELING DOWN, DEPRESSED OR HOPELESS: NOT AT ALL

## 2025-06-12 ASSESSMENT — ENCOUNTER SYMPTOMS
DIARRHEA: 0
DIFFICULTY URINATING: 0
TREMORS: 0
SINUS PRESSURE: 1
CONFUSION: 0
VOMITING: 0
CONSTIPATION: 0
FACIAL SWELLING: 0
COLOR CHANGE: 0
HEADACHES: 0
WHEEZING: 0
NECK STIFFNESS: 0
FATIGUE: 0
SINUS PAIN: 0
WEAKNESS: 0
CHILLS: 0
SHORTNESS OF BREATH: 0
HALLUCINATIONS: 0
NECK PAIN: 0
TROUBLE SWALLOWING: 0
COUGH: 0
VOICE CHANGE: 0
FEVER: 0

## 2025-06-12 NOTE — PATIENT INSTRUCTIONS
No mass., lesion, polyps or purulence in the nasal passage.  Symptoms likely due to seasonal and environmental allergies.  Use saline  spray prior to using Flonase.  Take Claritin daily.  Mild bleeding noted  in the left inferior nasal turbinates. Likely from Flonase use.  Stop Flonase for 3 days and clean nose with saline.  Resume us of Flonase after 3 days.  Follow up as needed.

## 2025-06-12 NOTE — PROGRESS NOTES
Subjective   Patient ID: Mima Shen is a 49 y.o. female who presents for sinusitis    HPI  Patient with history of HTN, heart mumur and hyperlipidemia presents here today for sinusitis.    Onset: Since childhood but as an adult has had periodic sinus infection.   Duration: When she has a sinus infection it last about 7-10 days.   Character: nasal congestion; post nasal drainage and nasal obstruction. Using Aleve D  Allergy testing: Hs seasonal allergy but has not had allergy testing since 20 years ago. States allergic to dust, pollen and pet dander.  Meds: Currently using Flonase. She has been using it for less than a year.   Sinus surgery or trauma: None  Loss of sense of taste or smell: Only when she had COVID in 2021  Epiphora: no  Periorbital edema: under eye puffiness.     Review of Systems   Constitutional:  Negative for chills, fatigue and fever.   HENT:  Positive for sinus pressure. Negative for ear discharge, ear pain, facial swelling, hearing loss, nosebleeds, sinus pain, tinnitus, trouble swallowing and voice change.    Respiratory:  Negative for cough, shortness of breath and wheezing.    Gastrointestinal:  Negative for constipation, diarrhea and vomiting.   Genitourinary:  Negative for difficulty urinating.   Musculoskeletal:  Negative for neck pain and neck stiffness.   Skin:  Negative for color change.   Neurological:  Negative for tremors, weakness and headaches.   Psychiatric/Behavioral:  Negative for confusion and hallucinations.            Objective   Physical Exam    CONSTITUTIONAL: No acute distress, normal facial features; No fever; no chills  VOICE: No hoarseness or other audible abnormality  RESPIRATION: Breathing comfortably, no stridor; normal breathing effort  CV: No cyanosis visible on the face and neck area  EYES:Pupils equal and round ; no erythema; conjunctiva clear; sclera white  NEURO: Alert and oriented, able to raise eyebrows symmetrical bilateral, smile with no facial droop,  able to swallow  HEAD AND FACE: Symmetric facial features, no masses or lesions    Right ear examination: External ear normal.   Left ear examination: External ear normal.     NOSE: External nose midline  ORAL CAVITY: No lesions of external lips; uvula is midline; tongue with good mobility; no gross mass in oral cavity; mucosa appears pink   NECK/LYMPH: No obvious deformity or lesions; trachea is midline  PSYCH: Alert and oriented with appropriate mood and affect.    Patient ID: Mima Shen is a 49 y.o. female.    Procedures    PROCEDURE NOTE:   Procedure: Nasal endoscopy - diagnostic  Indication: concern for chronic sinusitis  Informed Consent obtained: risks, benefits, alternatives, and expectations discussed with pt and the pt wishes to proceed.     Findings: After topical decongestion with decongestant and anesthetic spray, nasal endoscopy was performed using an endoscope. The septum was right deviated. The inferior turbinates were hypertrophied.  The middle turbinates appeared healthy, the middle meatus is free of purulence, masses, lesions or polyps. The nasal passageway is patent. The nasopharynx was clear. There were no complications and the patient tolerated the procedure well.  ;      Assessment/Plan     1. Chronic rhinitis        2. Hypertrophy of nasal turbinates        3. Nasal septal deviation        4. Nasal congestion        5. Seasonal allergic rhinitis due to other allergic trigger             No mass., lesion, polyps or purulence in the nasal passage.  Symptoms likely due to seasonal and environmental allergies.  Use saline  spray prior to using Flonase.  Take Claritin daily.  Mild bleeding noted  in the left inferior nasal turbinates. Likely from Flonase use.  Stop Flonase for 3 days and clean nose with saline.  Resume us of Flonase after 3 days.  Follow up as needed.    CAMPOS Andrade 06/12/25 11:20 AM

## 2025-07-14 ENCOUNTER — OFFICE VISIT (OUTPATIENT)
Dept: CARDIOLOGY | Facility: HOSPITAL | Age: 50
End: 2025-07-14
Payer: COMMERCIAL

## 2025-07-14 VITALS
WEIGHT: 170 LBS | SYSTOLIC BLOOD PRESSURE: 171 MMHG | DIASTOLIC BLOOD PRESSURE: 99 MMHG | HEART RATE: 90 BPM | OXYGEN SATURATION: 99 % | BODY MASS INDEX: 32.1 KG/M2 | HEIGHT: 61 IN

## 2025-07-14 DIAGNOSIS — I10 HYPERTENSION, UNSPECIFIED TYPE: Primary | ICD-10-CM

## 2025-07-14 DIAGNOSIS — E78.5 HYPERLIPIDEMIA, UNSPECIFIED HYPERLIPIDEMIA TYPE: ICD-10-CM

## 2025-07-14 PROCEDURE — 99214 OFFICE O/P EST MOD 30 MIN: CPT | Performed by: INTERNAL MEDICINE

## 2025-07-14 PROCEDURE — 99212 OFFICE O/P EST SF 10 MIN: CPT

## 2025-07-14 PROCEDURE — 3080F DIAST BP >= 90 MM HG: CPT | Performed by: INTERNAL MEDICINE

## 2025-07-14 PROCEDURE — 3077F SYST BP >= 140 MM HG: CPT | Performed by: INTERNAL MEDICINE

## 2025-07-14 PROCEDURE — 3008F BODY MASS INDEX DOCD: CPT | Performed by: INTERNAL MEDICINE

## 2025-07-14 RX ORDER — LOSARTAN POTASSIUM AND HYDROCHLOROTHIAZIDE 25; 100 MG/1; MG/1
1 TABLET ORAL DAILY
Qty: 90 TABLET | Refills: 3 | Status: SHIPPED | OUTPATIENT
Start: 2025-07-14 | End: 2026-07-14

## 2025-07-14 NOTE — PROGRESS NOTES
"Chief Complaint:   Follow-up     History Of Present Illness:    Mima Shen is a 49 y.o. female here for follow-up. She has a history of fibroids s/p hysteroscopic myomectomy, associated Fe deficient anemia, hyperlipidemia, and hypertension.    She reports feeling well and denies cardiovascular symptoms. Last visit she had a TTE for a heart murmur that was unremarkable. She was started on amlodipine for hypertension but developed hives. She was switched to losartan / hydrochlorothiazide and has noted pressures in the 140's to 150's with home checks. She admits to increased stress today.        Last Recorded Vitals:  Vitals:    07/14/25 1529   BP: (!) 171/99   Pulse: 90   SpO2: 99%   Weight: 77.1 kg (170 lb)   Height: (!) 1.549 m (5' 1\")              Allergies:  Amlodipine and Sulfamethoxazole-trimethoprim    Outpatient Medications:  Current Outpatient Medications   Medication Instructions    clindamycin (Cleocin T) 1 % lotion Topical, 2 times daily    EluRyng 0.12-0.015 mg/24 hr vaginal ring INSERT 1 RING VAGINALLY AND LEAVE IN PLACE FOR 3 WEEKS THEN REMOVE AND REPLACE IMMEDIATELY TO AVOID MENSES    fluticasone (Flonase) 50 mcg/actuation nasal spray 1 spray, Daily RT    losartan-hydrochlorothiazide (Hyzaar) 100-25 mg tablet 1 tablet, oral, Daily    triamcinolone (Kenalog) 0.025 % cream Apply twice daily to affected areas of body (avoid face, groin, body folds) for 2 weeks, then taper to twice daily on weekends only; repeat every 6-8 weeks as needed for flares         Physical Exam:  Gen Well appearing middle aged female sitting up in NAD. Body mass index is 32.12 kg/m².   CV rrr. No m/r/g appreciated. No JVD or leg edema.   Pulm Lungs clear with normal respiratory effort.  Neuro Alert and conversant. Grossly nonfocal.       I reviewed most recent imaging / labs / and office notes.      Assessment/Plan   1.  Hypertension   BP remains uncontrolled though better with home checks. Increasing her Hyzaar. Low threshold " for added prazosin if her pressures remain elevated over the next week. Continued exercise and weight loss encouraged.        2. Hyperlipidemia   History of. Last LDL 2/2025 was fair. Will consider CAC testing next visit to help determine whether statin therapy will be beneficial to her long term.        Follow-up 3 months         Frank Roblero MD

## 2025-07-23 ENCOUNTER — APPOINTMENT (OUTPATIENT)
Dept: PRIMARY CARE | Facility: CLINIC | Age: 50
End: 2025-07-23
Payer: COMMERCIAL

## 2025-07-23 VITALS
HEIGHT: 61 IN | SYSTOLIC BLOOD PRESSURE: 138 MMHG | OXYGEN SATURATION: 97 % | DIASTOLIC BLOOD PRESSURE: 88 MMHG | WEIGHT: 166 LBS | BODY MASS INDEX: 31.34 KG/M2 | HEART RATE: 81 BPM

## 2025-07-23 DIAGNOSIS — Z23 NEED FOR VACCINATION: ICD-10-CM

## 2025-07-23 DIAGNOSIS — I10 PRIMARY HYPERTENSION: Primary | ICD-10-CM

## 2025-07-23 DIAGNOSIS — E78.5 HYPERLIPIDEMIA, UNSPECIFIED HYPERLIPIDEMIA TYPE: ICD-10-CM

## 2025-07-23 DIAGNOSIS — Z12.31 ENCOUNTER FOR SCREENING MAMMOGRAM FOR BREAST CANCER: ICD-10-CM

## 2025-07-23 PROCEDURE — 99214 OFFICE O/P EST MOD 30 MIN: CPT | Performed by: PHYSICIAN ASSISTANT

## 2025-07-23 PROCEDURE — 3079F DIAST BP 80-89 MM HG: CPT | Performed by: PHYSICIAN ASSISTANT

## 2025-07-23 PROCEDURE — 3008F BODY MASS INDEX DOCD: CPT | Performed by: PHYSICIAN ASSISTANT

## 2025-07-23 PROCEDURE — 3075F SYST BP GE 130 - 139MM HG: CPT | Performed by: PHYSICIAN ASSISTANT

## 2025-07-23 PROCEDURE — 90750 HZV VACC RECOMBINANT IM: CPT | Performed by: PHYSICIAN ASSISTANT

## 2025-07-23 PROCEDURE — 90471 IMMUNIZATION ADMIN: CPT | Performed by: PHYSICIAN ASSISTANT

## 2025-07-23 ASSESSMENT — ENCOUNTER SYMPTOMS
PSYCHIATRIC NEGATIVE: 1
WHEEZING: 0
NAUSEA: 0
DEPRESSION: 0
ENDOCRINE NEGATIVE: 1
OCCASIONAL FEELINGS OF UNSTEADINESS: 0
CONSTITUTIONAL NEGATIVE: 1
EYES NEGATIVE: 1
BACK PAIN: 0
ABDOMINAL PAIN: 0
ARTHRALGIAS: 0
DIZZINESS: 0
ALLERGIC/IMMUNOLOGIC NEGATIVE: 1
NEUROLOGICAL NEGATIVE: 1
VOMITING: 0
SHORTNESS OF BREATH: 0
HEADACHES: 0
HEMATOLOGIC/LYMPHATIC NEGATIVE: 1
LOSS OF SENSATION IN FEET: 0
RESPIRATORY NEGATIVE: 1
MUSCULOSKELETAL NEGATIVE: 1
NERVOUS/ANXIOUS: 0
PALPITATIONS: 0
COUGH: 0

## 2025-07-23 ASSESSMENT — PATIENT HEALTH QUESTIONNAIRE - PHQ9
1. LITTLE INTEREST OR PLEASURE IN DOING THINGS: NOT AT ALL
SUM OF ALL RESPONSES TO PHQ9 QUESTIONS 1 AND 2: 0
2. FEELING DOWN, DEPRESSED OR HOPELESS: NOT AT ALL

## 2025-07-23 NOTE — PROGRESS NOTES
"Subjective   Patient ID: Mima Shen is a 49 y.o. female who presents for Follow-up.    HPI   Patient Mima Shen 49 y.o. female is here today for follow up     Mima Shen is a 49 y.o. female here for follow-up. She has a history of fibroids s/p hysteroscopic myomectomy, associated Fe deficient anemia, hyperlipidemia, and hypertension.     She reports feeling well and denies cardiovascular symptoms. Last visit she had a TTE for a heart murmur that was unremarkable. She was started on amlodipine for hypertension but developed hives. She was switched to losartan / hydrochlorothiazide and has noted pressures in the 140's to 150's with home checks.    patient states feeling well otherwise  denies fever, chills, N/V, headache, dizziness, CP, SOB, palpitations, edema, numbness, tingling, weakness  A chaperone was offered to the patient for the physical exam /  exam and was declined      Review of Systems   Constitutional: Negative.    HENT: Negative.     Eyes: Negative.    Respiratory: Negative.  Negative for cough, shortness of breath and wheezing.    Cardiovascular:  Negative for chest pain and palpitations.   Gastrointestinal:  Negative for abdominal pain, nausea and vomiting.   Endocrine: Negative.    Genitourinary: Negative.    Musculoskeletal: Negative.  Negative for arthralgias and back pain.   Skin: Negative.  Negative for rash.   Allergic/Immunologic: Negative.    Neurological: Negative.  Negative for dizziness and headaches.   Hematological: Negative.    Psychiatric/Behavioral: Negative.  The patient is not nervous/anxious.        Objective   /88 (BP Location: Right arm, Patient Position: Sitting)   Pulse 81   Ht (!) 1.549 m (5' 1\")   Wt 75.3 kg (166 lb)   SpO2 97%   BMI 31.37 kg/m²     Physical Exam  Vitals and nursing note reviewed.   Constitutional:       Appearance: Normal appearance.     Cardiovascular:      Rate and Rhythm: Normal rate and regular rhythm.   Pulmonary:      Effort: " Pulmonary effort is normal.      Breath sounds: Normal breath sounds.     Musculoskeletal:         General: Normal range of motion.      Cervical back: Neck supple.     Skin:     General: Skin is warm and dry.     Neurological:      General: No focal deficit present.      Mental Status: She is alert and oriented to person, place, and time.     Psychiatric:         Mood and Affect: Mood normal.         Behavior: Behavior normal.         Thought Content: Thought content normal.         Judgment: Judgment normal.         Assessment/Plan   Problem List Items Addressed This Visit       Hypertension - Primary    Hyperlipidemia, unspecified     Other Visit Diagnoses         Need for vaccination        Relevant Orders    Zoster vaccine, recombinant, adult (SHINGRIX) (Completed)          1.  Hypertension   Stsable - sees cardioo - increased meds at last visit   BP remains uncontrolled though better with home checks. Increasing her Hyzaar. Low threshold for added prazosin if her pressures remain elevated over the next week. Continued exercise and weight loss encouraged.         2. Hyperlipidemia   History of. Last LDL 2/2025 was fair. Will consider CAC testing next visit to help determine whether statin therapy will be beneficial to her long term.     Shingrex #2 dose today - tolerated well     Follow-up 3- 6  months     complexity visit of 35+  minutes face-to-face with at least half of the time discussing  Results of my examination, clinical findings, impression and diagnoses discussed with the patient as well as results of the latest test/lab work. The patient was in agreement with the plan and verbalized a good understanding of instructions and plans for treatment. At the end of the visit today, the patient felt that all questions had been answered satisfactorily. The patient was pleased with the visit and very appreciative for the care rendered.

## 2025-07-24 RX ORDER — ACETAMINOPHEN 500 MG
1 TABLET ORAL DAILY
Qty: 1 KIT | Refills: 0 | Status: SHIPPED | OUTPATIENT
Start: 2025-07-24

## 2025-08-06 ENCOUNTER — APPOINTMENT (OUTPATIENT)
Dept: DERMATOLOGY | Facility: CLINIC | Age: 50
End: 2025-08-06
Payer: COMMERCIAL

## 2026-01-12 ENCOUNTER — APPOINTMENT (OUTPATIENT)
Dept: ALLERGY | Facility: CLINIC | Age: 51
End: 2026-01-12
Payer: COMMERCIAL